# Patient Record
Sex: MALE | Race: WHITE | NOT HISPANIC OR LATINO | Employment: FULL TIME | URBAN - NONMETROPOLITAN AREA
[De-identification: names, ages, dates, MRNs, and addresses within clinical notes are randomized per-mention and may not be internally consistent; named-entity substitution may affect disease eponyms.]

---

## 2019-02-25 ENCOUNTER — OFFICE VISIT (OUTPATIENT)
Dept: FAMILY MEDICINE CLINIC | Facility: CLINIC | Age: 29
End: 2019-02-25
Payer: COMMERCIAL

## 2019-02-25 VITALS
HEART RATE: 97 BPM | BODY MASS INDEX: 47.25 KG/M2 | WEIGHT: 283.6 LBS | DIASTOLIC BLOOD PRESSURE: 68 MMHG | SYSTOLIC BLOOD PRESSURE: 120 MMHG | OXYGEN SATURATION: 98 % | HEIGHT: 65 IN | RESPIRATION RATE: 14 BRPM

## 2019-02-25 DIAGNOSIS — Z13.1 SCREENING FOR DIABETES MELLITUS (DM): ICD-10-CM

## 2019-02-25 DIAGNOSIS — R63.5 WEIGHT GAIN: ICD-10-CM

## 2019-02-25 DIAGNOSIS — Z13.6 SCREENING FOR ISCHEMIC HEART DISEASE: Primary | ICD-10-CM

## 2019-02-25 DIAGNOSIS — R21 RASH: ICD-10-CM

## 2019-02-25 PROCEDURE — 3008F BODY MASS INDEX DOCD: CPT | Performed by: FAMILY MEDICINE

## 2019-02-25 PROCEDURE — 1036F TOBACCO NON-USER: CPT | Performed by: FAMILY MEDICINE

## 2019-02-25 PROCEDURE — 99204 OFFICE O/P NEW MOD 45 MIN: CPT | Performed by: FAMILY MEDICINE

## 2019-02-25 RX ORDER — BUPROPION HYDROCHLORIDE 150 MG/1
150 TABLET ORAL DAILY
Qty: 30 TABLET | Refills: 5 | Status: SHIPPED | OUTPATIENT
Start: 2019-02-25 | End: 2019-09-05

## 2019-02-25 RX ORDER — TRIAMCINOLONE ACETONIDE 1 MG/G
CREAM TOPICAL 2 TIMES DAILY
Qty: 30 G | Refills: 5 | Status: SHIPPED | OUTPATIENT
Start: 2019-02-25 | End: 2020-01-06

## 2019-02-25 NOTE — PROGRESS NOTES
Assessment/Plan:    No problem-specific Assessment & Plan notes found for this encounter  Diagnoses and all orders for this visit:    Screening for ischemic heart disease  -     Lipid panel; Future    Screening for diabetes mellitus (DM)  -     Comprehensive metabolic panel; Future    Weight gain  -     TSH, 3rd generation; Future  -     buPROPion (WELLBUTRIN XL) 150 mg 24 hr tablet; Take 1 tablet (150 mg total) by mouth daily    Rash  -     triamcinolone (KENALOG) 0 1 % cream; Apply topically 2 (two) times a day          Subjective:      Patient ID: Caatrina Ayala is a 33 y o  male  He has a few complaints today:    1  He is gaining weight  He works in [employer] and does not get a lot of exercise  He plays a lot of video games  2   He is a   His hands are broken out and scaly  Certain gloves make the symptoms worse  The following portions of the patient's history were reviewed and updated as appropriate:   He  has no past medical history on file  He   Patient Active Problem List    Diagnosis Date Noted    Screening for ischemic heart disease 02/25/2019    Screening for diabetes mellitus (DM) 02/25/2019    Weight gain 02/25/2019     He  has no past surgical history on file  His family history includes Diabetes in his paternal uncle; Heart disease in his maternal grandfather and maternal grandmother; Hyperlipidemia in his maternal grandfather and maternal grandmother; Hypertension in his maternal grandfather and maternal grandmother  He  reports that he has quit smoking  He has never used smokeless tobacco  He reports that he drinks alcohol  He reports that he does not use drugs    Current Outpatient Medications   Medication Sig Dispense Refill    buPROPion (WELLBUTRIN XL) 150 mg 24 hr tablet Take 1 tablet (150 mg total) by mouth daily 30 tablet 5    triamcinolone (KENALOG) 0 1 % cream Apply topically 2 (two) times a day 30 g 5     No current facility-administered medications for this visit  No current outpatient medications on file prior to visit  No current facility-administered medications on file prior to visit  He has No Known Allergies       Review of Systems   All other systems reviewed and are negative  Objective:      /68 (BP Location: Right arm, Patient Position: Sitting, Cuff Size: Large)   Pulse 97   Resp 14   Ht 5' 5" (1 651 m)   Wt 129 kg (283 lb 9 6 oz)   SpO2 98%   BMI 47 19 kg/m²          Physical Exam   Constitutional: He is oriented to person, place, and time  He appears well-developed and well-nourished  Neck: Normal range of motion  Neck supple  Cardiovascular: Normal rate, regular rhythm, normal heart sounds and intact distal pulses  Pulmonary/Chest: Effort normal and breath sounds normal    Abdominal: Soft  Bowel sounds are normal    Musculoskeletal: Normal range of motion  Neurological: He is alert and oriented to person, place, and time  Skin: Skin is warm and dry  Capillary refill takes less than 2 seconds  Psychiatric: He has a normal mood and affect  His behavior is normal  Judgment and thought content normal    Nursing note and vitals reviewed

## 2019-09-05 ENCOUNTER — OFFICE VISIT (OUTPATIENT)
Dept: FAMILY MEDICINE CLINIC | Facility: CLINIC | Age: 29
End: 2019-09-05
Payer: COMMERCIAL

## 2019-09-05 VITALS
HEART RATE: 77 BPM | SYSTOLIC BLOOD PRESSURE: 124 MMHG | WEIGHT: 277 LBS | BODY MASS INDEX: 46.15 KG/M2 | RESPIRATION RATE: 16 BRPM | OXYGEN SATURATION: 97 % | DIASTOLIC BLOOD PRESSURE: 72 MMHG | HEIGHT: 65 IN

## 2019-09-05 DIAGNOSIS — M54.41 CHRONIC BILATERAL LOW BACK PAIN WITH RIGHT-SIDED SCIATICA: Primary | ICD-10-CM

## 2019-09-05 DIAGNOSIS — G89.29 CHRONIC BILATERAL LOW BACK PAIN WITH RIGHT-SIDED SCIATICA: Primary | ICD-10-CM

## 2019-09-05 PROCEDURE — 99213 OFFICE O/P EST LOW 20 MIN: CPT | Performed by: FAMILY MEDICINE

## 2019-09-05 PROCEDURE — 3008F BODY MASS INDEX DOCD: CPT | Performed by: FAMILY MEDICINE

## 2019-09-05 RX ORDER — NAPROXEN 500 MG/1
500 TABLET ORAL 2 TIMES DAILY WITH MEALS
Qty: 60 TABLET | Refills: 5 | Status: SHIPPED | OUTPATIENT
Start: 2019-09-05 | End: 2022-07-19 | Stop reason: ALTCHOICE

## 2019-09-05 RX ORDER — CYCLOBENZAPRINE HCL 10 MG
10 TABLET ORAL 3 TIMES DAILY PRN
Qty: 30 TABLET | Refills: 5 | Status: SHIPPED | OUTPATIENT
Start: 2019-09-05 | End: 2020-01-06

## 2019-09-05 NOTE — PROGRESS NOTES
BMI Counseling: Body mass index is 46 1 kg/m²  Discussed the patient's BMI with him  The BMI is above average  BMI counseling and education was provided to the patient  Nutrition recommendations include reducing portion sizes, decreasing overall calorie intake, 3-5 servings of fruits/vegetables daily, reducing fast food intake, consuming healthier snacks, decreasing soda and/or juice intake, moderation in carbohydrate intake, increasing intake of lean protein, reducing intake of saturated fat and trans fat and reducing intake of cholesterol  Exercise recommendations include moderate aerobic physical activity for 150 minutes/week, vigorous aerobic physical activity for 75 minutes/week, exercising 3-5 times per week, joining a gym and strength training exercises  Assessment/Plan:    No problem-specific Assessment & Plan notes found for this encounter  Diagnoses and all orders for this visit:    Chronic bilateral low back pain with right-sided sciatica  -     naproxen (NAPROSYN) 500 mg tablet; Take 1 tablet (500 mg total) by mouth 2 (two) times a day with meals  -     cyclobenzaprine (FLEXERIL) 10 mg tablet; Take 1 tablet (10 mg total) by mouth 3 (three) times a day as needed for muscle spasms          Subjective:      Patient ID: Kong Carrillo is a 26 y o  male  He is doing well overall  He is losing weight  He is down 6 pounds since his last visit  He is not interested in weight loss medications  He is watching his diet  He is not exercising, however  He has numbness in his right leg  He has low back pain that runs down the right leg  The numbness is lateral   He has some morning stiffness but it lasts less than 30 minutes  The following portions of the patient's history were reviewed and updated as appropriate:   He  has no past medical history on file    He   Patient Active Problem List    Diagnosis Date Noted    Screening for ischemic heart disease 02/25/2019    Screening for diabetes mellitus (DM) 02/25/2019    Weight gain 02/25/2019     He  has no past surgical history on file  His family history includes Diabetes in his paternal uncle; Heart disease in his maternal grandfather and maternal grandmother; Hyperlipidemia in his maternal grandfather and maternal grandmother; Hypertension in his maternal grandfather and maternal grandmother  He  reports that he has quit smoking  He has never used smokeless tobacco  He reports that he drinks alcohol  He reports that he does not use drugs  Current Outpatient Medications   Medication Sig Dispense Refill    cyclobenzaprine (FLEXERIL) 10 mg tablet Take 1 tablet (10 mg total) by mouth 3 (three) times a day as needed for muscle spasms 30 tablet 5    naproxen (NAPROSYN) 500 mg tablet Take 1 tablet (500 mg total) by mouth 2 (two) times a day with meals 60 tablet 5    triamcinolone (KENALOG) 0 1 % cream Apply topically 2 (two) times a day 30 g 5     No current facility-administered medications for this visit  Current Outpatient Medications on File Prior to Visit   Medication Sig    triamcinolone (KENALOG) 0 1 % cream Apply topically 2 (two) times a day    [DISCONTINUED] buPROPion (WELLBUTRIN XL) 150 mg 24 hr tablet Take 1 tablet (150 mg total) by mouth daily     No current facility-administered medications on file prior to visit  He has No Known Allergies       Review of Systems   All other systems reviewed and are negative  Objective:      /72   Pulse 77   Resp 16   Ht 5' 5" (1 651 m)   Wt 126 kg (277 lb)   SpO2 97%   BMI 46 10 kg/m²          Physical Exam   Constitutional: He is oriented to person, place, and time  He appears well-developed and well-nourished  Neck: Normal range of motion  Neck supple  Cardiovascular: Normal rate, regular rhythm, normal heart sounds and intact distal pulses  Pulmonary/Chest: Effort normal and breath sounds normal    Abdominal: Soft   Bowel sounds are normal    Musculoskeletal: Normal range of motion  Neurological: He is alert and oriented to person, place, and time  Skin: Skin is warm and dry  Capillary refill takes less than 2 seconds  Psychiatric: He has a normal mood and affect  His behavior is normal  Judgment and thought content normal    Nursing note and vitals reviewed

## 2020-01-06 ENCOUNTER — OFFICE VISIT (OUTPATIENT)
Dept: FAMILY MEDICINE CLINIC | Facility: CLINIC | Age: 30
End: 2020-01-06
Payer: COMMERCIAL

## 2020-01-06 VITALS
HEIGHT: 65 IN | WEIGHT: 297 LBS | BODY MASS INDEX: 49.48 KG/M2 | OXYGEN SATURATION: 97 % | DIASTOLIC BLOOD PRESSURE: 76 MMHG | SYSTOLIC BLOOD PRESSURE: 124 MMHG | HEART RATE: 80 BPM

## 2020-01-06 DIAGNOSIS — R04.0 EPISTAXIS: ICD-10-CM

## 2020-01-06 DIAGNOSIS — J32.9 CHRONIC SINUSITIS, UNSPECIFIED LOCATION: Primary | ICD-10-CM

## 2020-01-06 PROCEDURE — 99213 OFFICE O/P EST LOW 20 MIN: CPT | Performed by: FAMILY MEDICINE

## 2020-01-06 PROCEDURE — 3008F BODY MASS INDEX DOCD: CPT | Performed by: FAMILY MEDICINE

## 2020-01-06 PROCEDURE — 1036F TOBACCO NON-USER: CPT | Performed by: FAMILY MEDICINE

## 2020-01-06 RX ORDER — FLUTICASONE PROPIONATE 50 MCG
2 SPRAY, SUSPENSION (ML) NASAL DAILY
Qty: 1 BOTTLE | Refills: 5 | Status: SHIPPED | OUTPATIENT
Start: 2020-01-06 | End: 2022-07-19 | Stop reason: ALTCHOICE

## 2020-01-06 NOTE — PROGRESS NOTES
Assessment/Plan:    No problem-specific Assessment & Plan notes found for this encounter  Diagnoses and all orders for this visit:    Chronic sinusitis, unspecified location  -     fluticasone (FLONASE) 50 mcg/act nasal spray; 2 sprays into each nostril daily    Epistaxis  -     Ambulatory Referral to Otolaryngology; Future          Subjective:      Patient ID: Latoya Mathew is a 26 y o  male  He had nose bleeds as a child requiring cauterization  He has not had them again until recently  He does pick his nose  He complains of chronic nasal crusting and drainage  He has gas heat  He does well with cool mist humidity and Vaseline  He has no bleeding disorder or family history of bleeding disorder  The following portions of the patient's history were reviewed and updated as appropriate:   He  has no past medical history on file  He   Patient Active Problem List    Diagnosis Date Noted    Screening for ischemic heart disease 02/25/2019    Screening for diabetes mellitus (DM) 02/25/2019    Weight gain 02/25/2019     He  has no past surgical history on file  His family history includes Diabetes in his paternal uncle; Heart disease in his maternal grandfather and maternal grandmother; Hyperlipidemia in his maternal grandfather and maternal grandmother; Hypertension in his maternal grandfather and maternal grandmother  He  reports that he has quit smoking  He has never used smokeless tobacco  He reports that he drinks alcohol  He reports that he does not use drugs  Current Outpatient Medications   Medication Sig Dispense Refill    naproxen (NAPROSYN) 500 mg tablet Take 1 tablet (500 mg total) by mouth 2 (two) times a day with meals 60 tablet 5    fluticasone (FLONASE) 50 mcg/act nasal spray 2 sprays into each nostril daily 1 Bottle 5     No current facility-administered medications for this visit        Current Outpatient Medications on File Prior to Visit   Medication Sig    naproxen (NAPROSYN) 500 mg tablet Take 1 tablet (500 mg total) by mouth 2 (two) times a day with meals    [DISCONTINUED] cyclobenzaprine (FLEXERIL) 10 mg tablet Take 1 tablet (10 mg total) by mouth 3 (three) times a day as needed for muscle spasms (Patient not taking: Reported on 1/6/2020)    [DISCONTINUED] triamcinolone (KENALOG) 0 1 % cream Apply topically 2 (two) times a day (Patient not taking: Reported on 1/6/2020)     No current facility-administered medications on file prior to visit  He has No Known Allergies       Review of Systems   HENT: Positive for nosebleeds  All other systems reviewed and are negative  Objective:      /76   Pulse 80   Ht 5' 5" (1 651 m)   Wt 135 kg (297 lb)   SpO2 97%   BMI 49 42 kg/m²          Physical Exam   Constitutional: He is oriented to person, place, and time  He appears well-developed and well-nourished  HENT:   Plum vessel septum left nare  Neck: Normal range of motion  Neck supple  Cardiovascular: Normal rate, regular rhythm, normal heart sounds and intact distal pulses  Pulmonary/Chest: Effort normal and breath sounds normal    Abdominal: Soft  Bowel sounds are normal    Musculoskeletal: Normal range of motion  Neurological: He is alert and oriented to person, place, and time  Skin: Skin is warm and dry  Capillary refill takes less than 2 seconds  Psychiatric: He has a normal mood and affect  His behavior is normal  Judgment and thought content normal    Nursing note and vitals reviewed

## 2020-07-06 ENCOUNTER — OFFICE VISIT (OUTPATIENT)
Dept: FAMILY MEDICINE CLINIC | Facility: CLINIC | Age: 30
End: 2020-07-06
Payer: COMMERCIAL

## 2020-07-06 VITALS
TEMPERATURE: 98.4 F | SYSTOLIC BLOOD PRESSURE: 110 MMHG | HEIGHT: 65 IN | HEART RATE: 84 BPM | BODY MASS INDEX: 48.32 KG/M2 | DIASTOLIC BLOOD PRESSURE: 72 MMHG | OXYGEN SATURATION: 97 % | WEIGHT: 290 LBS

## 2020-07-06 DIAGNOSIS — Z00.00 ANNUAL PHYSICAL EXAM: Primary | ICD-10-CM

## 2020-07-06 DIAGNOSIS — Z13.220 SCREENING FOR LIPID DISORDERS: ICD-10-CM

## 2020-07-06 DIAGNOSIS — B07.9 VERRUCA VULGARIS: ICD-10-CM

## 2020-07-06 DIAGNOSIS — G56.03 BILATERAL CARPAL TUNNEL SYNDROME: ICD-10-CM

## 2020-07-06 DIAGNOSIS — Z13.29 SCREENING FOR THYROID DISORDER: ICD-10-CM

## 2020-07-06 PROCEDURE — 99213 OFFICE O/P EST LOW 20 MIN: CPT | Performed by: FAMILY MEDICINE

## 2020-07-06 PROCEDURE — 3008F BODY MASS INDEX DOCD: CPT | Performed by: FAMILY MEDICINE

## 2020-07-06 PROCEDURE — 1036F TOBACCO NON-USER: CPT | Performed by: FAMILY MEDICINE

## 2020-07-06 RX ORDER — IMIQUIMOD 12.5 MG/.25G
1 CREAM TOPICAL 3 TIMES WEEKLY
Qty: 12 EACH | Refills: 0 | Status: SHIPPED | OUTPATIENT
Start: 2020-07-06

## 2020-07-06 NOTE — PROGRESS NOTES
Assessment/Plan:    Kevin Montoya is a 26year old male with morbid obesity and recent diagnosis of bilateral carpal tunnel syndrome presents today for follow up visit  Will prescribe cock-up wrist splint to wear at night  On examination there are multiple verruca vulgaris in bilateral fingers  Prescribed Aldara 5% cream to apply three times a week  Routine blood works ordered  BMI Counseling: Body mass index is 48 26 kg/m²  The BMI is above normal  Nutrition recommendations include reducing portion sizes, decreasing overall calorie intake, 3-5 servings of fruits/vegetables daily, reducing fast food intake, consuming healthier snacks, decreasing soda and/or juice intake, moderation in carbohydrate intake, increasing intake of lean protein, reducing intake of saturated fat and trans fat and reducing intake of cholesterol  Exercise recommendations include moderate aerobic physical activity for 150 minutes/week, vigorous aerobic physical activity for 75 minutes/week, exercising 3-5 times per week, joining a gym and strength training exercises  F/u in 3 months  Case d/w Dr Deshaun Berg  Diagnoses and all orders for this visit:    Annual physical exam  -     Basic metabolic panel; Future    Screening for thyroid disorder  -     Basic metabolic panel; Future  -     TSH, 3rd generation; Future    Bilateral carpal tunnel syndrome  -     Cock Up Wrist Splint    Verruca vulgaris  -     imiquimod (ALDARA) 5 % cream; Apply 1 packet topically 3 (three) times a week    Screening for lipid disorders  -     Lipid panel; Future        Subjective:      Patient ID: Conor Garza is a 26 y o  male  HPI     Patient is a 26year old male with morbid obesity with BMI of 48 and allergic rhinitis presents today for follow up visit and complaints of rash in bilateral hands   Patient states he recently applied for a job at [employer] and obtained physical at wellness center in [facility] diagnosed with bilateral carpal tunnel syndrome  Patient endorses numbness involved medial aspect of bilateral hand occurs at night  But denied muscle weakness or pain or change in daily activity  Patient reports few months duration of rash in bilateral fingers  There are multiple raised firm papules and one clustered lesions at left third finger  Rash is non-pruritic and not painful but slowly enlarging  The following portions of the patient's history were reviewed and updated as appropriate: allergies, current medications, past family history, past medical history, past social history, past surgical history and problem list     Review of Systems   Constitutional: Negative for chills and fever  HENT: Negative for congestion, postnasal drip and sore throat  Eyes: Negative for visual disturbance  Respiratory: Negative for cough and shortness of breath  Cardiovascular: Negative for chest pain, palpitations and leg swelling  Gastrointestinal: Negative for abdominal pain, nausea and vomiting  Genitourinary: Negative for dysuria  Musculoskeletal: Negative for back pain  Skin: Positive for rash  Neurological: Negative for dizziness and headaches  Hematological: Does not bruise/bleed easily  Psychiatric/Behavioral: Negative for confusion  Objective: There were no vitals taken for this visit  Physical Exam   Constitutional: He is oriented to person, place, and time  He appears well-developed  No distress  Obese   HENT:   Head: Normocephalic  Eyes: Conjunctivae are normal  Right eye exhibits no discharge  Left eye exhibits no discharge  No scleral icterus  Neck: Normal range of motion  Cardiovascular: Normal rate, regular rhythm and normal heart sounds  No murmur heard  Pulmonary/Chest: Effort normal and breath sounds normal  No respiratory distress  He has no wheezes  Abdominal: Soft  Bowel sounds are normal  He exhibits no distension  There is no tenderness     Musculoskeletal:   Positive Phalen test   Negative Tinel's signs b/l  Strength and sensation intact in bilateral forearm and fingers   Lymphadenopathy:     He has no cervical adenopathy  Neurological: He is alert and oriented to person, place, and time  Skin:   Firm 5-6 mm hyperkeratotic papules in left third distal phalanx   Multiple 3-4 mm papules in bilateral fingers    Nursing note and vitals reviewed

## 2022-07-19 ENCOUNTER — OFFICE VISIT (OUTPATIENT)
Dept: FAMILY MEDICINE CLINIC | Facility: CLINIC | Age: 32
End: 2022-07-19
Payer: COMMERCIAL

## 2022-07-19 VITALS
HEIGHT: 66 IN | WEIGHT: 290.4 LBS | OXYGEN SATURATION: 98 % | TEMPERATURE: 97.5 F | BODY MASS INDEX: 46.67 KG/M2 | DIASTOLIC BLOOD PRESSURE: 88 MMHG | HEART RATE: 79 BPM | SYSTOLIC BLOOD PRESSURE: 122 MMHG | RESPIRATION RATE: 18 BRPM

## 2022-07-19 DIAGNOSIS — Z00.00 ANNUAL PHYSICAL EXAM: Primary | ICD-10-CM

## 2022-07-19 DIAGNOSIS — Z13.220 SCREENING, LIPID: ICD-10-CM

## 2022-07-19 DIAGNOSIS — R06.83 SNORING: ICD-10-CM

## 2022-07-19 DIAGNOSIS — Z11.59 NEED FOR HEPATITIS C SCREENING TEST: ICD-10-CM

## 2022-07-19 DIAGNOSIS — E66.01 MORBID OBESITY (HCC): ICD-10-CM

## 2022-07-19 DIAGNOSIS — Z11.4 SCREENING FOR HIV (HUMAN IMMUNODEFICIENCY VIRUS): ICD-10-CM

## 2022-07-19 DIAGNOSIS — R07.89 CHEST PRESSURE: ICD-10-CM

## 2022-07-19 PROBLEM — R63.5 WEIGHT GAIN: Status: RESOLVED | Noted: 2019-02-25 | Resolved: 2022-07-19

## 2022-07-19 PROCEDURE — 99214 OFFICE O/P EST MOD 30 MIN: CPT | Performed by: FAMILY MEDICINE

## 2022-07-19 PROCEDURE — 93000 ELECTROCARDIOGRAM COMPLETE: CPT | Performed by: FAMILY MEDICINE

## 2022-07-19 PROCEDURE — 99395 PREV VISIT EST AGE 18-39: CPT | Performed by: FAMILY MEDICINE

## 2022-07-19 RX ORDER — IBUPROFEN 200 MG
200 TABLET ORAL EVERY 6 HOURS PRN
COMMUNITY

## 2022-07-19 NOTE — PROGRESS NOTES
[id number]    NAME: Aspen Castro  AGE: 27 y o  SEX: male  : 1990     DATE: 2022     Assessment and Plan:     1  Need for hepatitis C screening test  - Hepatitis C Antibody ([facility]); Future    2  Screening for HIV (human immunodeficiency virus)  - HIV 1/2 Antigen/Antibody (4th Generation) w Reflex [facility]; Future    3  Annual physical exam  - needs to work on weight loss  He and his s/o are motivated to do this and have a plan in place  They are having conversations around family planning in 2 years  - utd on immunizations  - I see no atypical skin lesions/nevi on exam today  Reassurance offered  For other changes/concerns, offered Derm referral for thorough skin check  He will let me know  - BMI Counseling: Body mass index is 46 87 kg/m²  The BMI is above normal  Nutrition recommendations include decreasing portion sizes, encouraging healthy choices of fruits and vegetables, decreasing fast food intake, consuming healthier snacks, limiting drinks that contain sugar, reducing intake of saturated and trans fat and reducing intake of cholesterol  Exercise recommendations include exercising 3-5 times per week and strength training exercises  Rationale for BMI follow-up plan is due to patient being overweight or obese  4  Morbid obesity ([facility] [address] )  - see above  Monitor weight  - Ambulatory Referral to Sleep Medicine; Future  - TSH, 3rd generation with Free T4 reflex; Future  - CBC and differential; Future    5  Snoring  - referral to sleep medicine     - Ambulatory Referral to Sleep Medicine; Future  - CBC and differential; Future    6  Screening, lipid    7  Chest pressure  - EKG is NSR, Normal Interval   See scanned  Will have him implement lifestyle changes and will phone me for persistence, can order Stress test   Will also have his test for JAZZY      - Lipid Panel with Direct LDL reflex; Future  - Comprehensive metabolic panel; Future  - CBC and differential; Future  - POCT ECG    Immunizations and preventive care screenings were discussed with patient today  Appropriate education was printed on patient's after visit summary  Counseling:  Alcohol/drug use: discussed moderation in alcohol intake, the recommendations for healthy alcohol use, and avoidance of illicit drug use  Dental Health: discussed importance of regular tooth brushing, flossing, and dental visits  Injury prevention: discussed safety/seat belts, safety helmets, smoke detectors, carbon dioxide detectors, and smoking near bedding or upholstery  Sexual health: discussed sexually transmitted diseases, partner selection, use of condoms, avoidance of unintended pregnancy, and contraceptive alternatives  · Exercise: the importance of regular exercise/physical activity was discussed  Recommend exercise 3-5 times per week for at least 30 minutes  Return in about 3 months (around 10/19/2022) for f/u Weight   Chief Complaint:     Chief Complaint   Patient presents with    Physical Exam     Annual Exam  Mole on back of neck  Chest tightness periodically  Right leg pain  History of Present Illness:     Adult Annual Physical   Patient here for a comprehensive physical exam  The patient reports:      Patient here for routine wellness today  There is a new skin lesion on his back that his girlfriend would like for him to get seen  There is intermittent chest "tightness" that he tells me is there for about 10-15 seconds and then goes away  This has been going on for 6-8 months  He is not with sob or carrillo that is worse over time  He endorses he is overweight and this causes him to be sob  He is much more physically active than he used to be with his new job  He notes that he is able to do more physically than he used too  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  He is much more physical with this work        Diet and Physical Activity  · Diet/Nutrition: poor diet  He and his s/o are working on this  · Exercise: no formal exercise  Depression Screening  PHQ-2/9 Depression Screening         General Health  · Sleep: sleeps poorly  Snores  · Hearing: no issues  · Vision: no vision problems  · Dental: no dental visits for >1 year   Health  · History of STDs?: no      Review of Systems:     Review of Systems   All other systems reviewed and are negative  Past Medical History:     History reviewed  No pertinent past medical history  Past Surgical History:     History reviewed  No pertinent surgical history     Social History:     Social History     Socioeconomic History    Marital status: Single     Spouse name: None    Number of children: None    Years of education: None    Highest education level: None   Occupational History    None   Tobacco Use    Smoking status: Former Smoker     Quit date: 2018     Years since quittin 5    Smokeless tobacco: Never Used   Vaping Use    Vaping Use: Never used   Substance and Sexual Activity    Alcohol use: Yes     Comment: socially    Drug use: Never    Sexual activity: Yes     Partners: Female   Other Topics Concern    None   Social History Narrative    None     Social Determinants of Health     Financial Resource Strain: Not on file   Food Insecurity: Not on file   Transportation Needs: Not on file   Physical Activity: Not on file   Stress: Not on file   Social Connections: Not on file   Intimate Partner Violence: Not on file   Housing Stability: Not on file      Family History:     Family History   Problem Relation Age of Onset    Other Mother         passed away of MVA    Arthritis Father     Sleep apnea Father     Heart disease Maternal Grandmother     Hypertension Maternal Grandmother     Hyperlipidemia Maternal Grandmother     Dementia Maternal Grandmother     Heart disease Maternal Grandfather     Hypertension Maternal Grandfather  Hyperlipidemia Maternal Grandfather     Coronary artery disease Paternal Grandmother     Coronary artery disease Paternal Grandfather          in late 46s of MI    Diabetes Paternal Uncle       Current Medications:     Current Outpatient Medications   Medication Sig Dispense Refill    ibuprofen (MOTRIN) 200 mg tablet Take 200 mg by mouth every 6 (six) hours as needed for mild pain      imiquimod (ALDARA) 5 % cream Apply 1 packet topically 3 (three) times a week (Patient not taking: Reported on 2022) 12 each 0     No current facility-administered medications for this visit  Allergies:     No Known Allergies   Physical Exam:     /88 (BP Location: Right arm, Patient Position: Sitting, Cuff Size: Large)   Pulse 79   Temp 97 5 °F (36 4 °C) (Temporal)   Resp 18   Ht 5' 6" (1 676 m)   Wt 132 kg (290 lb 6 4 oz)   SpO2 98%   BMI 46 87 kg/m²     Physical Exam  Vitals and nursing note reviewed  Constitutional:       Appearance: Normal appearance  He is obese  HENT:      Head: Normocephalic and atraumatic  Right Ear: Tympanic membrane and ear canal normal       Left Ear: Tympanic membrane and ear canal normal    Eyes:      Conjunctiva/sclera: Conjunctivae normal    Cardiovascular:      Rate and Rhythm: Normal rate and regular rhythm  Heart sounds: Normal heart sounds  Comments: EKG - NSR/Normal Intervals   Pulmonary:      Effort: Pulmonary effort is normal       Breath sounds: Normal breath sounds  No wheezing, rhonchi or rales  Abdominal:      General: Bowel sounds are normal       Palpations: Abdomen is soft  Musculoskeletal:         General: No deformity  Cervical back: Neck supple  Lymphadenopathy:      Cervical: No cervical adenopathy  Skin:     General: Skin is warm and dry  Comments: I see no atypical skin lesions/nevi in exam today  Neurological:      General: No focal deficit present        Mental Status: He is alert and oriented to person, place, and time  Psychiatric:         Mood and Affect: Mood normal          Behavior: Behavior normal          Thought Content:  Thought content normal          Judgment: Judgment normal           DO Steve Rhodes

## 2022-07-19 NOTE — PATIENT INSTRUCTIONS

## 2024-02-21 PROBLEM — Z13.1 SCREENING FOR DIABETES MELLITUS (DM): Status: RESOLVED | Noted: 2019-02-25 | Resolved: 2024-02-21

## 2024-02-21 PROBLEM — Z13.6 SCREENING FOR ISCHEMIC HEART DISEASE: Status: RESOLVED | Noted: 2019-02-25 | Resolved: 2024-02-21

## 2025-07-31 DIAGNOSIS — R10.12 LEFT UPPER QUADRANT PAIN: ICD-10-CM

## 2025-08-12 ENCOUNTER — HOSPITAL ENCOUNTER (OUTPATIENT)
Dept: CT IMAGING | Facility: HOSPITAL | Age: 35
Discharge: HOME | End: 2025-08-12
Attending: STUDENT IN AN ORGANIZED HEALTH CARE EDUCATION/TRAINING PROGRAM
Payer: OTHER MISCELLANEOUS